# Patient Record
(demographics unavailable — no encounter records)

---

## 2024-12-01 NOTE — PHYSICAL EXAM
[de-identified] : General: Well-nourished, well-developed, alert, and in no acute distress. Head: Normocephalic. Eyes: Pupils equal, extraocular muscles intact, normal sclera. Nose: No nasal discharge. Cardiovascular: Extremities are warm and well perfused. Respiratory: No labored breathing. Extremities: Sensation is intact distally bilaterally.  Lymphatic: No regional lymphadenopathy, no lymphedema Neurologic: No focal deficits Skin: Normal skin color, texture, and turgor Psychiatric: Normal affect  MSK: Examination of left hip: Inspection: No erythema, hematoma or lesions. Gait slow, shuffling, Trendelenburg Ambulating with cane   Palpation: Nontender to palpation:  inguinal crease, pubic symphysis, psoas tendon, GT bursa, along the ischial tuberosity, gluteals, piriformis, SI joint, paraspinals  Range of Motion: Internal rotation: [0] degrees, External rotation: [60] degrees, Flexion [100] degrees  Special tests: Log roll positive PRASANNA positive FADIR positive   Examination of right hip: Inspection: No erythema, hematoma or lesions. Palpation: Nontender to palpation: pubic symphysis, inguinal crease, psoas tendon, GT bursa, along the ischial tuberosity, gluteals, piriformis, SI joint and paraspinals  Range of Motion: Internal rotation: [5] degrees, External rotation: [80] degrees, Flexion [110] degrees  Special tests: PRASANNA negative FADIR negative

## 2024-12-01 NOTE — DISCUSSION/SUMMARY

## 2024-12-01 NOTE — HISTORY OF PRESENT ILLNESS
[de-identified] : TONIO RAHMAN is a 83 year old female presents to follow up for left hip. Last visit was 11/29/2023 She is still experiencing pain. Pain comes and goes. She would like another prescription for Etodolac.  Patient reports hip pain has mostly resolved. Still taking etodolac twice daily most days. States recently she's had difficulty passing urine, which is a concern for her renal function. States that she also gets occasional gastritis. Patient presents with her home health aideXiomara.

## 2024-12-01 NOTE — PHYSICAL EXAM
[de-identified] : General: Well-nourished, well-developed, alert, and in no acute distress. Head: Normocephalic. Eyes: Pupils equal, extraocular muscles intact, normal sclera. Nose: No nasal discharge. Cardiovascular: Extremities are warm and well perfused. Respiratory: No labored breathing. Extremities: Sensation is intact distally bilaterally.  Lymphatic: No regional lymphadenopathy, no lymphedema Neurologic: No focal deficits Skin: Normal skin color, texture, and turgor Psychiatric: Normal affect  MSK: Examination of left hip: Inspection: No erythema, hematoma or lesions. Gait slow, shuffling, Trendelenburg Ambulating with cane   Palpation: Nontender to palpation:  inguinal crease, pubic symphysis, psoas tendon, GT bursa, along the ischial tuberosity, gluteals, piriformis, SI joint, paraspinals  Range of Motion: Internal rotation: [0] degrees, External rotation: [60] degrees, Flexion [100] degrees  Special tests: Log roll positive PRASANNA positive FADIR positive   Examination of right hip: Inspection: No erythema, hematoma or lesions. Palpation: Nontender to palpation: pubic symphysis, inguinal crease, psoas tendon, GT bursa, along the ischial tuberosity, gluteals, piriformis, SI joint and paraspinals  Range of Motion: Internal rotation: [5] degrees, External rotation: [80] degrees, Flexion [110] degrees  Special tests: PRASANNA negative FADIR negative

## 2024-12-01 NOTE — HISTORY OF PRESENT ILLNESS
[de-identified] : TONIO RAHMAN is a 83 year old female presents to follow up for left hip. Last visit was 11/29/2023 She is still experiencing pain. Pain comes and goes. She would like another prescription for Etodolac.  Patient reports hip pain has mostly resolved. Still taking etodolac twice daily most days. States recently she's had difficulty passing urine, which is a concern for her renal function. States that she also gets occasional gastritis. Patient presents with her home health aideXiomara.

## 2024-12-01 NOTE — ASSESSMENT
[FreeTextEntry1] : TONIO RAHMAN is a 83 year old female with bilateral hip (L>R) pain    I discussed with the patient that their symptoms, signs, and imaging are most consistent with osteoarthritis. We reviewed the natural history of this condition and treatment options. We agreed on the following plan:  X-rays reviewed again today.  Encouraged to start home exercises for hip conditioning and strength and balance for seniors. Handouts provided.  Start physical therapy. Referral provided.  Medication: Advised against Etodolac use and or any other NSAIDs as currently oliguric and has GI upset. Advised to take acetaminophen, 1 g TID PRN, prescription provided. Consider [place] guided left hip CSI If symptoms worsen. Follow up in eight weeks.

## 2024-12-01 NOTE — END OF VISIT
[FreeTextEntry3] :   Documented by Glenys Huang acting as a scribe for Dr. Kiara Shay. 11/27/2024   All medical record entries made by the Glenys Huang (Scribe) were at my, Dr. Kiara Shay, direction and personally dictated by me on 11/27/2024. I have reviewed the chart and agree that the record accurately reflects my personal performance of the history, physical exam, assessment and plan. I have also personally directed, reviewed, and agreed with the chart. [Time Spent: ___ minutes] : I have spent [unfilled] minutes of time on the encounter which excludes teaching and separately reported services.

## 2024-12-01 NOTE — DISCUSSION/SUMMARY

## 2025-02-09 NOTE — END OF VISIT
[FreeTextEntry3] : Documented by Jaja Barragan acting as a scribe for Kiara Shay on 02/06/2025   All medical record entries made by the Scribe were at my, Dr. Kiara Shay direction and personally dictated by me on 02/06/2025 . I have reviewed the chart and agree that the record accurately reflects my personal performance of the history, physical exam, assessment and plan. I have also personally directed, reviewed, and agreed with the chart.

## 2025-02-09 NOTE — ADDENDUM
[FreeTextEntry1] : I, Jaja Barragan ([facility]) assisted in filling out this chart under the dictation of Kiara Shay on 02/06/2025 .

## 2025-02-09 NOTE — PHYSICAL EXAM
[de-identified] : General: Well-nourished, well-developed, alert, and in no acute distress. Head: Normocephalic. Eyes: Pupils equal, extraocular muscles intact, normal sclera. Nose: No nasal discharge. Cardiovascular: Extremities are warm and well perfused. Respiratory: No labored breathing. Extremities: Sensation is intact distally bilaterally.  Lymphatic: No regional lymphadenopathy, no lymphedema Neurologic: No focal deficits Skin: Normal skin color, texture, and turgor Psychiatric: Normal affect  MSK: Examination of left hip: Inspection: No erythema, hematoma or lesions. Gait slow, shuffling, Trendelenburg Ambulating with cane  Palpation: Nontender to palpation: inguinal crease, pubic symphysis, psoas tendon, GT bursa, along the ischial tuberosity, gluteals, piriformis, SI joint, paraspinals  Range of Motion: Internal rotation: [0] degrees, External rotation: [60] degrees, Flexion [100] degrees  Special tests: Log roll positive PRASANNA positive FADIR positive   [de-identified] : Ultrasound guided intra-articular left hip injection:  Following a discussion of the risks (bleeding, infection, lack of response) and benefits, verbal consent was obtained. Patient placed in the supine position with femur slightly externally rotated and knee in 30 deg flexion. The femoral head and neck junction was identified with Sonosite US 15 MHz transducer. The area was anaesthetised with ethyl chloride spray then prepped with chlorhexadine. Under strict sterile technique a 25 G 1.5 inch needle was inserted and after negative aspiration, a mixture of 1mL 1% Lidocaine and 2 mL of 0.5% Bupivacaine was injected subcutaneously. Following adequate anaesthesia, a 22 G 3.5 inch needle was inserted into the joint capsule at the femoral head and neck junction under US guidance using inferior approach (Short axis view). After negative aspiration, a mixture of 1 ml of 40mg/mL of Triamcinolone, 2 mL of 1% Lidocaine and 4mL of 0.5% Bupivacaine was injected without resistance. Needle location was confirmed with US.   The use of direct ultrasound visualization was necessary to increase patient safety by identifying and avoiding inadvertent needle placement within the neurovascular and osteochondral structures. Additionally, the increased accuracy of needle placement may improve therapeutic efficacy and allow higher diagnostic specificity when evaluating the effectiveness of this injection.   The patient tolerated the procedure well. Post-injection instructions given including rest, avoiding vigorous activity for 48 hours, and icing. Patient verbalilsed understanding.

## 2025-02-09 NOTE — END OF VISIT
[FreeTextEntry3] : Documented by Jaja Barragan acting as a scribe for Kiara Shay on 02/06/2025   All medical record entries made by the Scribe were at my, Dr. Kiara Shya direction and personally dictated by me on 02/06/2025 . I have reviewed the chart and agree that the record accurately reflects my personal performance of the history, physical exam, assessment and plan. I have also personally directed, reviewed, and agreed with the chart.

## 2025-02-09 NOTE — HISTORY OF PRESENT ILLNESS
[de-identified] : TONIO RAHMAN is an 83-year-old female who presents for a follow-up of bilateral hip pain. Last visit was on 11/27/2024. She is still experiencing sharp pain in the bilateral hips. She has not been attending PT. Therapist would like to see MRI or Xray. She is here with her daugther Ember on the phone. Tylenol has not been effective in managing the pain. The patient has not been performing exercises due to the pain. The patient is considering stronger pain relief options. The provider discussed the possibility of a corticosteroid injection for pain relief.

## 2025-02-09 NOTE — ASSESSMENT
[FreeTextEntry1] : TONIO RAHMAN is an 83-year-old female with left hip pain.  I discussed with the patient that their symptoms, signs, and imaging are most consistent with osteoarthritis. We reviewed the natural history of this condition and treatment options. We agreed on the following plan:   X-ray reviewed with the patient today and report faxed to Physical Therapy. US-guided CSI as detailed above. Encouraged to restart home exercises per handout. Restart physical therapy. New referral provided and faxed to PT. Medication: Acetaminophen 1g TID. Patient can also take Ibuprofen 400 mg once every 3 days as needed for additional pain relief. Follow up in 3 months

## 2025-02-09 NOTE — ADDENDUM
[FreeTextEntry1] : I, Jaja Barragan ([facility]) assisted in filling out this chart under the dictation of Kiara Shay on 02/06/2025 .

## 2025-02-09 NOTE — PHYSICAL EXAM
[de-identified] : General: Well-nourished, well-developed, alert, and in no acute distress. Head: Normocephalic. Eyes: Pupils equal, extraocular muscles intact, normal sclera. Nose: No nasal discharge. Cardiovascular: Extremities are warm and well perfused. Respiratory: No labored breathing. Extremities: Sensation is intact distally bilaterally.  Lymphatic: No regional lymphadenopathy, no lymphedema Neurologic: No focal deficits Skin: Normal skin color, texture, and turgor Psychiatric: Normal affect  MSK: Examination of left hip: Inspection: No erythema, hematoma or lesions. Gait slow, shuffling, Trendelenburg Ambulating with cane  Palpation: Nontender to palpation: inguinal crease, pubic symphysis, psoas tendon, GT bursa, along the ischial tuberosity, gluteals, piriformis, SI joint, paraspinals  Range of Motion: Internal rotation: [0] degrees, External rotation: [60] degrees, Flexion [100] degrees  Special tests: Log roll positive PRASANNA positive FADIR positive   [de-identified] : Ultrasound guided intra-articular left hip injection:  Following a discussion of the risks (bleeding, infection, lack of response) and benefits, verbal consent was obtained. Patient placed in the supine position with femur slightly externally rotated and knee in 30 deg flexion. The femoral head and neck junction was identified with Sonosite US 15 MHz transducer. The area was anaesthetised with ethyl chloride spray then prepped with chlorhexadine. Under strict sterile technique a 25 G 1.5 inch needle was inserted and after negative aspiration, a mixture of 1mL 1% Lidocaine and 2 mL of 0.5% Bupivacaine was injected subcutaneously. Following adequate anaesthesia, a 22 G 3.5 inch needle was inserted into the joint capsule at the femoral head and neck junction under US guidance using inferior approach (Short axis view). After negative aspiration, a mixture of 1 ml of 40mg/mL of Triamcinolone, 2 mL of 1% Lidocaine and 4mL of 0.5% Bupivacaine was injected without resistance. Needle location was confirmed with US.   The use of direct ultrasound visualization was necessary to increase patient safety by identifying and avoiding inadvertent needle placement within the neurovascular and osteochondral structures. Additionally, the increased accuracy of needle placement may improve therapeutic efficacy and allow higher diagnostic specificity when evaluating the effectiveness of this injection.   The patient tolerated the procedure well. Post-injection instructions given including rest, avoiding vigorous activity for 48 hours, and icing. Patient verbalilsed understanding.

## 2025-02-09 NOTE — HISTORY OF PRESENT ILLNESS
[de-identified] : TONIO RAHMAN is an 83-year-old female who presents for a follow-up of bilateral hip pain. Last visit was on 11/27/2024. She is still experiencing sharp pain in the bilateral hips. She has not been attending PT. Therapist would like to see MRI or Xray. She is here with her daugther Ember on the phone. Tylenol has not been effective in managing the pain. The patient has not been performing exercises due to the pain. The patient is considering stronger pain relief options. The provider discussed the possibility of a corticosteroid injection for pain relief.

## 2025-05-10 NOTE — HISTORY OF PRESENT ILLNESS
[de-identified] : TONIO RAHMAN is an 83-year-old female who presents for a follow-up of left hip pain. Last visit was on 02/06/2024, Pt. had left hip CSI and stated it did help for approximately 3 weeks. She reports the pain is worse now. Currently, she experiences difficulty walking due to the weight on her left hip. She is taking Tylenol and Advil daily for pain and uses a heating pad, which she states does not provide relief. The patient has a phobia of movement, which has prevented her from engaging in exercises. She is ambulating with a cane. She reports pain primarily inside the hip, with no pain on the side. Pt. presents with her home health aide today physically and her daughter via phone. The patient is considering a joint replacement.

## 2025-05-10 NOTE — DISCUSSION/SUMMARY

## 2025-05-10 NOTE — HISTORY OF PRESENT ILLNESS
[de-identified] : TONIO RAHMAN is an 83-year-old female who presents for a follow-up of left hip pain. Last visit was on 02/06/2024, Pt. had left hip CSI and stated it did help for approximately 3 weeks. She reports the pain is worse now. Currently, she experiences difficulty walking due to the weight on her left hip. She is taking Tylenol and Advil daily for pain and uses a heating pad, which she states does not provide relief. The patient has a phobia of movement, which has prevented her from engaging in exercises. She is ambulating with a cane. She reports pain primarily inside the hip, with no pain on the side. Pt. presents with her home health aide today physically and her daughter via phone. The patient is considering a joint replacement.

## 2025-05-10 NOTE — END OF VISIT
[Time Spent: ___ minutes] : I have spent [unfilled] minutes of time on the encounter which excludes teaching and separately reported services. [FreeTextEntry3] : Documented by Jaja Barragan acting as a scribe for Kiara Sahy on 05/08/2025   All medical record entries made by the Scribe were at my, Dr. Kiara Shay direction and personally dictated by me on 05/08/2025 . I have reviewed the chart and agree that the record accurately reflects my personal performance of the history, physical exam, assessment and plan. I have also personally directed, reviewed, and agreed with the chart.

## 2025-05-10 NOTE — PHYSICAL EXAM
[de-identified] : General: Well-nourished, well-developed, alert, and in no acute distress. Head: Normocephalic. Eyes: Pupils equal, extraocular muscles intact, normal sclera. Nose: No nasal discharge. Cardiovascular: Extremities are warm and well perfused. Respiratory: No labored breathing. Extremities: Sensation is intact distally bilaterally.  Lymphatic: No regional lymphadenopathy, no lymphedema Neurologic: No focal deficits Skin: Normal skin color, texture, and turgor Psychiatric: Normal affect  MSK: Examination of left hip: Inspection: No erythema, hematoma or lesions. Gait slow, shuffling, Trendelenburg Ambulating with cane  Palpation: Nontender to palpation: inguinal crease, pubic symphysis, psoas tendon, GT bursa, along the ischial tuberosity, gluteals, piriformis, SI joint, paraspinals  Range of Motion: Internal rotation: [0] degrees, External rotation: [60] degrees, Flexion [100] degrees  Special tests: Log roll positive PRASANNA positive FADIR positive

## 2025-05-10 NOTE — ADDENDUM
[FreeTextEntry1] : I, Jaja Barragan ([facility]) assisted in filling out this chart under the dictation of Kiara Shay on 05/08/2025 .

## 2025-05-10 NOTE — PHYSICAL EXAM
[de-identified] : General: Well-nourished, well-developed, alert, and in no acute distress. Head: Normocephalic. Eyes: Pupils equal, extraocular muscles intact, normal sclera. Nose: No nasal discharge. Cardiovascular: Extremities are warm and well perfused. Respiratory: No labored breathing. Extremities: Sensation is intact distally bilaterally.  Lymphatic: No regional lymphadenopathy, no lymphedema Neurologic: No focal deficits Skin: Normal skin color, texture, and turgor Psychiatric: Normal affect  MSK: Examination of left hip: Inspection: No erythema, hematoma or lesions. Gait slow, shuffling, Trendelenburg Ambulating with cane  Palpation: Nontender to palpation: inguinal crease, pubic symphysis, psoas tendon, GT bursa, along the ischial tuberosity, gluteals, piriformis, SI joint, paraspinals  Range of Motion: Internal rotation: [0] degrees, External rotation: [60] degrees, Flexion [100] degrees  Special tests: Log roll positive PRASANNA positive FADIR positive

## 2025-05-10 NOTE — DISCUSSION/SUMMARY

## 2025-05-10 NOTE — ADDENDUM
[FreeTextEntry1] : I, Jaja Barragan ([facility]) assisted in filling out this chart under the dictation of Kiara Shay on 05/08/2025 .

## 2025-05-10 NOTE — ASSESSMENT
[FreeTextEntry1] : TONIO RAHMAN is an 83-year-old female with left hip pain.    I discussed with the patient that their symptoms, signs, and imaging are most consistent with osteoarthritis. We reviewed the natural history of this condition and treatment options. We agreed on the following plan:   XR reviewed again with the patient today. Encouraged to start home exercises for hip conditioning. New handout provided. Start physical therapy. New referral provided. Recommend 150 min per week of moderate-intensity aerobic activity  Medication: Etodolac 400mg BID PRN prescription provided. Acetaminophen 1g TID PRN. Discussed possible repeat US-guided CSI but deferred due to limited relief from the previous injection. Provided contact information for a joint replacement specialist to discuss possible LOBO. Follow up in 6-8 weeks.

## 2025-05-10 NOTE — END OF VISIT
[Time Spent: ___ minutes] : I have spent [unfilled] minutes of time on the encounter which excludes teaching and separately reported services. [FreeTextEntry3] : Documented by Jaja Barragan acting as a scribe for Kiara Shay on 05/08/2025   All medical record entries made by the Scribe were at my, Dr. Kiara Shay direction and personally dictated by me on 05/08/2025 . I have reviewed the chart and agree that the record accurately reflects my personal performance of the history, physical exam, assessment and plan. I have also personally directed, reviewed, and agreed with the chart.

## 2025-07-02 NOTE — HISTORY OF PRESENT ILLNESS
[de-identified] : TONIO RAHMAN is an 83-year-old female who presents for a follow-up of left hip pain. Last visit was 05/08/2023. The patient reports persistent left hip pain that has not improved since the last visit. She describes the pain as severe and bothersome. Previously, an injection was not performed as it provided only temporary relief.

## 2025-07-02 NOTE — PHYSICAL EXAM
[de-identified] : General: Well-nourished, well-developed, alert, and in no acute distress. Head: Normocephalic. Eyes: Pupils equal, extraocular muscles intact, normal sclera. Nose: No nasal discharge. Cardiovascular: Extremities are warm and well perfused. Respiratory: No labored breathing. Extremities: Sensation is intact distally bilaterally.  Lymphatic: No regional lymphadenopathy, no lymphedema Neurologic: No focal deficits Skin: Normal skin color, texture, and turgor Psychiatric: Normal affect   MSK: Examination of left hip: Inspection: No erythema, hematoma or lesions. Gait antalgic Ambulating with cane  Range of Motion: Internal rotation: [0] degrees, External rotation: [60] degrees, Flexion [100] degrees  Special tests: Log roll positive PRASANNA positive FADIR positive [de-identified] : Date: 06/26/2025 Location: [facility] Body part: XRAY B/L HIPs independently reviewed and interpreted by me. Impression: There is no evidence of fracture or dislocation. 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   Date: 06/26/2025 Location: [facility] Body part: XRAY L-SPINE independently reviewed and interpreted by me. Impression: There is no evidence of fracture. Slight loss of lordosis. Retrolisthesis L5 on S1. Intervertebral disc space narrowing L4-5, L5-S1. There is moderate facet arthrosis.

## 2025-07-02 NOTE — PHYSICAL EXAM
[de-identified] : General: Well-nourished, well-developed, alert, and in no acute distress. Head: Normocephalic. Eyes: Pupils equal, extraocular muscles intact, normal sclera. Nose: No nasal discharge. Cardiovascular: Extremities are warm and well perfused. Respiratory: No labored breathing. Extremities: Sensation is intact distally bilaterally.  Lymphatic: No regional lymphadenopathy, no lymphedema Neurologic: No focal deficits Skin: Normal skin color, texture, and turgor Psychiatric: Normal affect   MSK: Examination of left hip: Inspection: No erythema, hematoma or lesions. Gait antalgic Ambulating with cane  Range of Motion: Internal rotation: [0] degrees, External rotation: [60] degrees, Flexion [100] degrees  Special tests: Log roll positive PRASANNA positive FADIR positive [de-identified] : Date: 06/26/2025 Location: [facility] Body part: XRAY B/L HIPs independently reviewed and interpreted by me. Impression: There is no evidence of fracture or dislocation. 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   Date: 06/26/2025 Location: [facility] Body part: XRAY L-SPINE independently reviewed and interpreted by me. Impression: There is no evidence of fracture. Slight loss of lordosis. Retrolisthesis L5 on S1. Intervertebral disc space narrowing L4-5, L5-S1. There is moderate facet arthrosis.

## 2025-07-02 NOTE — END OF VISIT
[Time Spent: ___ minutes] : I have spent [unfilled] minutes of time on the encounter which excludes teaching and separately reported services. [FreeTextEntry3] : Documented by Jaja Barragan acting as a scribe for Kiara Shay on 06/27/2025.   All medical record entries made by the Scribe were at my, Dr. Kiara Shay, direction and personally dictated by me on 06/27/2025. I have reviewed the chart and agree that the record accurately reflects my personal performance of the history, physical exam, assessment, and plan. I have also personally directed, reviewed, and agreed with the chart.

## 2025-07-02 NOTE — PHYSICAL EXAM
[de-identified] : General: Well-nourished, well-developed, alert, and in no acute distress. Head: Normocephalic. Eyes: Pupils equal, extraocular muscles intact, normal sclera. Nose: No nasal discharge. Cardiovascular: Extremities are warm and well perfused. Respiratory: No labored breathing. Extremities: Sensation is intact distally bilaterally.  Lymphatic: No regional lymphadenopathy, no lymphedema Neurologic: No focal deficits Skin: Normal skin color, texture, and turgor Psychiatric: Normal affect   MSK: Examination of left hip: Inspection: No erythema, hematoma or lesions. Gait antalgic Ambulating with cane  Range of Motion: Internal rotation: [0] degrees, External rotation: [60] degrees, Flexion [100] degrees  Special tests: Log roll positive PRASANNA positive FADIR positive [de-identified] : Date: 06/26/2025 Location: [facility] Body part: XRAY B/L HIPs independently reviewed and interpreted by me. Impression: There is no evidence of fracture or dislocation. 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   Date: 06/26/2025 Location: [facility] Body part: XRAY L-SPINE independently reviewed and interpreted by me. Impression: There is no evidence of fracture. Slight loss of lordosis. Retrolisthesis L5 on S1. Intervertebral disc space narrowing L4-5, L5-S1. There is moderate facet arthrosis.

## 2025-07-02 NOTE — DISCUSSION/SUMMARY

## 2025-07-02 NOTE — ADDENDUM
[FreeTextEntry1] : I, Jaja Barragan ([facility]) assisted in filling out this chart under the dictation of Kiara Shay on 06/27/2025.

## 2025-07-02 NOTE — DISCUSSION/SUMMARY

## 2025-07-02 NOTE — ADDENDUM
[FreeTextEntry1] : I, Jaja Barragan ([facility]) assisted in filling out this chart under the dictation of Kiara Shay on 06/27/2025.

## 2025-07-02 NOTE — ASSESSMENT
[FreeTextEntry1] : TONIO RAHMAN is an 83-year-old female with left hip pain.    I discussed with the patient that their symptoms, signs, and imaging are most consistent with osteoarthritis, and lumbar spondylolisthesis. We reviewed the natural history of this condition and treatment options. We agreed on the following plan:    XRs taken and reviewed with the patient today. US-guided left hip CSI as detailed above. Encouraged to continue home exercises per handout. Medication: Etodolac PRN refill prescription provided. Patient was provided with contact information for pain management to discuss possible FRENCH. Patient was provided with contact information for a joint replacement specialist to discuss possible LOBO. Follow-up in 3 months.

## 2025-07-02 NOTE — PROCEDURE
[de-identified] : Ultrasound guided intra-articular left hip injection:  Following a discussion of the risks (bleeding, infection, lack of response) and benefits, verbal consent was obtained. Patient placed in the supine position with femur slightly externally rotated and knee in 30 deg flexion. The femoral head and neck junction was identified with Sonosite US 15 MHz transducer. The area was anaesthetised with ethyl chloride spray then prepped with chlorhexadine. Under strict sterile technique a 25 G 1.5 inch needle was inserted and after negative aspiration, a mixture of 1mL 1% Lidocaine and 2 mL of 0.5% Bupivacaine was injected subcutaneously. Following adequate anaesthesia, a 22 G 3.5 inch needle was inserted into the joint capsule at the femoral head and neck junction under US guidance using inferior approach (Short axis view). After negative aspiration, a mixture of 1 ml of 40mg/mL of Triamcinolone, 2 mL of 1% Lidocaine and 4mL of 0.5% Bupivacaine was injected without resistance. Needle location was confirmed with US.   The use of direct ultrasound visualization was necessary to increase patient safety by identifying and avoiding inadvertent needle placement within the neurovascular and osteochondral structures. Additionally, the increased accuracy of needle placement may improve therapeutic efficacy and allow higher diagnostic specificity when evaluating the effectiveness of this injection.   The patient tolerated the procedure well. Post-injection instructions given including rest, avoiding vigorous activity for 48 hours, and icing. Patient verbalilsed understanding.

## 2025-07-02 NOTE — HISTORY OF PRESENT ILLNESS
[de-identified] : TONIO RAHMAN is an 83-year-old female who presents for a follow-up of left hip pain. Last visit was 05/08/2023. The patient reports persistent left hip pain that has not improved since the last visit. She describes the pain as severe and bothersome. Previously, an injection was not performed as it provided only temporary relief.

## 2025-07-02 NOTE — HISTORY OF PRESENT ILLNESS
[de-identified] : TONIO RAHMAN is an 83-year-old female who presents for a follow-up of left hip pain. Last visit was 05/08/2023. The patient reports persistent left hip pain that has not improved since the last visit. She describes the pain as severe and bothersome. Previously, an injection was not performed as it provided only temporary relief.

## 2025-07-02 NOTE — PROCEDURE
[de-identified] : Ultrasound guided intra-articular left hip injection:  Following a discussion of the risks (bleeding, infection, lack of response) and benefits, verbal consent was obtained. Patient placed in the supine position with femur slightly externally rotated and knee in 30 deg flexion. The femoral head and neck junction was identified with Sonosite US 15 MHz transducer. The area was anaesthetised with ethyl chloride spray then prepped with chlorhexadine. Under strict sterile technique a 25 G 1.5 inch needle was inserted and after negative aspiration, a mixture of 1mL 1% Lidocaine and 2 mL of 0.5% Bupivacaine was injected subcutaneously. Following adequate anaesthesia, a 22 G 3.5 inch needle was inserted into the joint capsule at the femoral head and neck junction under US guidance using inferior approach (Short axis view). After negative aspiration, a mixture of 1 ml of 40mg/mL of Triamcinolone, 2 mL of 1% Lidocaine and 4mL of 0.5% Bupivacaine was injected without resistance. Needle location was confirmed with US.   The use of direct ultrasound visualization was necessary to increase patient safety by identifying and avoiding inadvertent needle placement within the neurovascular and osteochondral structures. Additionally, the increased accuracy of needle placement may improve therapeutic efficacy and allow higher diagnostic specificity when evaluating the effectiveness of this injection.   The patient tolerated the procedure well. Post-injection instructions given including rest, avoiding vigorous activity for 48 hours, and icing. Patient verbalilsed understanding.

## 2025-07-02 NOTE — DISCUSSION/SUMMARY

## 2025-07-02 NOTE — PROCEDURE
[de-identified] : Ultrasound guided intra-articular left hip injection:  Following a discussion of the risks (bleeding, infection, lack of response) and benefits, verbal consent was obtained. Patient placed in the supine position with femur slightly externally rotated and knee in 30 deg flexion. The femoral head and neck junction was identified with Sonosite US 15 MHz transducer. The area was anaesthetised with ethyl chloride spray then prepped with chlorhexadine. Under strict sterile technique a 25 G 1.5 inch needle was inserted and after negative aspiration, a mixture of 1mL 1% Lidocaine and 2 mL of 0.5% Bupivacaine was injected subcutaneously. Following adequate anaesthesia, a 22 G 3.5 inch needle was inserted into the joint capsule at the femoral head and neck junction under US guidance using inferior approach (Short axis view). After negative aspiration, a mixture of 1 ml of 40mg/mL of Triamcinolone, 2 mL of 1% Lidocaine and 4mL of 0.5% Bupivacaine was injected without resistance. Needle location was confirmed with US.   The use of direct ultrasound visualization was necessary to increase patient safety by identifying and avoiding inadvertent needle placement within the neurovascular and osteochondral structures. Additionally, the increased accuracy of needle placement may improve therapeutic efficacy and allow higher diagnostic specificity when evaluating the effectiveness of this injection.   The patient tolerated the procedure well. Post-injection instructions given including rest, avoiding vigorous activity for 48 hours, and icing. Patient verbalilsed understanding.

## 2025-07-02 NOTE — ADDENDUM
[FreeTextEntry1] : I, Jaja Barragan ([facility]) assisted in filling out this chart under the dictation of Kiara Shay on 06/27/2025.

## 2025-07-02 NOTE — PROCEDURE
[de-identified] : Ultrasound guided intra-articular left hip injection:  Following a discussion of the risks (bleeding, infection, lack of response) and benefits, verbal consent was obtained. Patient placed in the supine position with femur slightly externally rotated and knee in 30 deg flexion. The femoral head and neck junction was identified with Sonosite US 15 MHz transducer. The area was anaesthetised with ethyl chloride spray then prepped with chlorhexadine. Under strict sterile technique a 25 G 1.5 inch needle was inserted and after negative aspiration, a mixture of 1mL 1% Lidocaine and 2 mL of 0.5% Bupivacaine was injected subcutaneously. Following adequate anaesthesia, a 22 G 3.5 inch needle was inserted into the joint capsule at the femoral head and neck junction under US guidance using inferior approach (Short axis view). After negative aspiration, a mixture of 1 ml of 40mg/mL of Triamcinolone, 2 mL of 1% Lidocaine and 4mL of 0.5% Bupivacaine was injected without resistance. Needle location was confirmed with US.   The use of direct ultrasound visualization was necessary to increase patient safety by identifying and avoiding inadvertent needle placement within the neurovascular and osteochondral structures. Additionally, the increased accuracy of needle placement may improve therapeutic efficacy and allow higher diagnostic specificity when evaluating the effectiveness of this injection.   The patient tolerated the procedure well. Post-injection instructions given including rest, avoiding vigorous activity for 48 hours, and icing. Patient verbalilsed understanding.

## 2025-07-02 NOTE — PHYSICAL EXAM
[de-identified] : General: Well-nourished, well-developed, alert, and in no acute distress. Head: Normocephalic. Eyes: Pupils equal, extraocular muscles intact, normal sclera. Nose: No nasal discharge. Cardiovascular: Extremities are warm and well perfused. Respiratory: No labored breathing. Extremities: Sensation is intact distally bilaterally.  Lymphatic: No regional lymphadenopathy, no lymphedema Neurologic: No focal deficits Skin: Normal skin color, texture, and turgor Psychiatric: Normal affect   MSK: Examination of left hip: Inspection: No erythema, hematoma or lesions. Gait antalgic Ambulating with cane  Range of Motion: Internal rotation: [0] degrees, External rotation: [60] degrees, Flexion [100] degrees  Special tests: Log roll positive PRASANNA positive FADIR positive [de-identified] : Date: 06/26/2025 Location: [facility] Body part: XRAY B/L HIPs independently reviewed and interpreted by me. Impression: There is no evidence of fracture or dislocation. 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   Date: 06/26/2025 Location: [facility] Body part: XRAY L-SPINE independently reviewed and interpreted by me. Impression: There is no evidence of fracture. Slight loss of lordosis. Retrolisthesis L5 on S1. Intervertebral disc space narrowing L4-5, L5-S1. There is moderate facet arthrosis.

## 2025-07-02 NOTE — DISCUSSION/SUMMARY

## 2025-07-02 NOTE — ADDENDUM
[FreeTextEntry1] : I, Jaja Barragan ([facility]) assisted in filling out this chart under the dictation of Kiara Shay on 06/27/2025.

## 2025-07-02 NOTE — HISTORY OF PRESENT ILLNESS
[de-identified] : TONIO RAHMAN is an 83-year-old female who presents for a follow-up of left hip pain. Last visit was 05/08/2023. The patient reports persistent left hip pain that has not improved since the last visit. She describes the pain as severe and bothersome. Previously, an injection was not performed as it provided only temporary relief.

## 2025-07-21 NOTE — PHYSICAL EXAM
[Antalgic] : antalgic [] : Sensory: [de-identified] : Gait: She walks with an antalgic gait.  Inspection: Hip appears unremarkable No lymphedema observed. No pitting edema observed. No ulcerations observed  Palpation: Tenderness to palpation of greater trochanter  Range of Motion:         Left: Pain with internal and external rotation   Both hips are stable no sign of dislocation or subluxation on exam [de-identified] : 4 views of hip show severe DJD with broad bone on bone apposition and deformation of the femoral head.

## 2025-07-21 NOTE — DISCUSSION/SUMMARY
[de-identified] : Severe left hip arthritis. I had a long discussion with the patient regarding hip arthritis / degenerative disease and treatment options. We reviewed the nature and etiology of degenerative hip disease.  We discussed the spectrum of symptomatic treatments. Our discussion included the use of appropriate exercises, activity modifications, weight reduction and maintenance, appropriate medication use, use of assistive devices, role of injections and avoidance of high impact activities  Given the clinical symptoms, physical exam and imaging findings, we discussed the possibility of hip replacement surgery.  We reviewed the elective quality of life nature of the procedure and the details of the surgery, approach and the implants used, using the model  in the clinic. This included discussion of the material and fixation options.  We also discussed the risks, benefits and expected and potential outcomes at length.  The details of those risks are below.  Category 1 includes risks that could occur in association with any operation. They include heart attack, stroke, blood clot and pulmonary embolism, wound infection, transfusion reaction, drug allergy, and complications related to anesthesia. This list is not intended to be complete but only to convey a broad range of general medical risks to be aware of.  Blood clots may lead to a block in circulation. A blood clot that completely blocks a large artery can lead to gangrene. If this happens an amputation may be required. Blood clots in leg veins lead to pain and swelling. If part of the clot breaks off it can travel to the brain and lead to a stroke. A clot can also travel to the lung, resulting in a pulmonary embolism. Medication after surgery will minimize but not eliminate these complications.  Category 2 is a list of risks and complications specifically related to total or partial joint replacement. This list is not complete but is intended to make the patient aware of the kinds of implant-related risks and complications that might occur.    1. If the device gets loose or wears out further surgery may be required to revise the prosthesis. 2. If an infection develops, further surgery to washout the joint, remove or replace parts, or insert an antibiotic spacer may be required 3. Muscle, Tendon, Nerve and blood vessel damage may result as a consequence of mobilization of the joint or dissection near these structures. These injuries can lead to weakness, numbness and paralysis. The damage may be temporary or permanent. The recovery process can be long and may require further procedures.   4. Dislocations and instability may also require further surgery.   5. Periprosthetic fracture requiring revision surgery. 6. Leg length discrepancy  7. Stiffness 8. Wound complications requiring either local wound care, revision surgery, or plastic surgery consultation  9. Chronic pain requiring pain management   She feels she would like to proceed.  We would plan a posterior approach would have cement available if needed.  Will plan a home discharge.  She will need an overnight stay.  She will have support from her aide who is with her..  We did discuss that we will need to wait 3 months given her recent injection and she understands.  I like her to do some physical therapy for preconditioning in the interim the provided a prescription for that.  Patient has been very fearful of physical therapy in the past but we had a long discussion about how this will be important both before and after surgery for her recovery.  Patient expressed understanding and strong desire to proceed.  All questions answered.    Plan: Left total hip arthroplasty  Equipment: [employer] cemented Synergy stem and R3 cup; catalyst stem  Evaluations: PCP   I, Dr. Lucia, personally performed the evaluation and management (E/M) services for this patient. That E/M includes conducting the clinically appropriate initial history &/or exam, assessing all conditions, and establishing the plan of care. Today, my LUZMARIA, [employer], was here to observe my evaluation and management service for this patient & follow plan of care established by me going forward.

## 2025-07-21 NOTE — HISTORY OF PRESENT ILLNESS
[8] : a maximum pain level of 8/10 [Constant] : ~He/She~ states the symptoms seem to be constant [Direct Pressure] : worsened by direct pressure [Hip Movement] : worsened by hip movement [Sitting] : worsened by sitting [Walking] : worsened by walking [de-identified] : TONIO RAHMAN is a pleasant 84 year old female with a history of DM, HTN, lymphoma (in remission), and GERD who complains of left hip pain over the past two years. The pain is located in the groin and lateral region of the hip and does not radiate. She denies prior injury or trauma. She notes the pain is worse with walking, sit to stand, laying down and rates it 8 out of 10 at its worst. She can walk around 1 block before having to stop due to the pain. She does have rest/nighttime pain. She has been treated non-surgically with ice/heat, Etodolac, APAP, uses a cane and a Coritsone injection two weeks ago but was not durable. The left hip pain significantly interferes with their ADLs and quality of life. Denies any fevers and chills.  [Acetaminophen] : not relieved by acetaminophen [NSAIDs] : not relieved by nonsteroidal anti-inflammatory drugs